# Patient Record
Sex: FEMALE | Race: BLACK OR AFRICAN AMERICAN | Employment: UNEMPLOYED | URBAN - METROPOLITAN AREA
[De-identification: names, ages, dates, MRNs, and addresses within clinical notes are randomized per-mention and may not be internally consistent; named-entity substitution may affect disease eponyms.]

---

## 2019-03-17 ENCOUNTER — APPOINTMENT (OUTPATIENT)
Dept: CT IMAGING | Age: 44
End: 2019-03-17
Payer: COMMERCIAL

## 2019-03-17 ENCOUNTER — HOSPITAL ENCOUNTER (EMERGENCY)
Age: 44
Discharge: ANOTHER HOSPITAL | End: 2019-03-17
Attending: EMERGENCY MEDICINE
Payer: COMMERCIAL

## 2019-03-17 ENCOUNTER — APPOINTMENT (OUTPATIENT)
Dept: GENERAL RADIOLOGY | Age: 44
End: 2019-03-17
Payer: COMMERCIAL

## 2019-03-17 VITALS
SYSTOLIC BLOOD PRESSURE: 143 MMHG | RESPIRATION RATE: 17 BRPM | HEART RATE: 92 BPM | BODY MASS INDEX: 29.16 KG/M2 | HEIGHT: 65 IN | DIASTOLIC BLOOD PRESSURE: 96 MMHG | WEIGHT: 175 LBS | OXYGEN SATURATION: 100 % | TEMPERATURE: 98.4 F

## 2019-03-17 DIAGNOSIS — K56.609 SMALL BOWEL OBSTRUCTION (HCC): Primary | ICD-10-CM

## 2019-03-17 LAB
A/G RATIO: 1.5 (ref 1.1–2.2)
ALBUMIN SERPL-MCNC: 4.6 G/DL (ref 3.4–5)
ALP BLD-CCNC: 62 U/L (ref 40–129)
ALT SERPL-CCNC: 18 U/L (ref 10–40)
ANION GAP SERPL CALCULATED.3IONS-SCNC: 16 MMOL/L (ref 3–16)
AST SERPL-CCNC: 20 U/L (ref 15–37)
BASE EXCESS VENOUS: -3 (ref -3–3)
BASOPHILS ABSOLUTE: 0 K/UL (ref 0–0.2)
BASOPHILS RELATIVE PERCENT: 0.2 %
BILIRUB SERPL-MCNC: 0.3 MG/DL (ref 0–1)
BILIRUBIN URINE: NEGATIVE
BLOOD, URINE: ABNORMAL
BUN BLDV-MCNC: 19 MG/DL (ref 7–20)
CALCIUM SERPL-MCNC: 9.3 MG/DL (ref 8.3–10.6)
CHLORIDE BLD-SCNC: 104 MMOL/L (ref 99–110)
CLARITY: CLEAR
CO2: 20 MMOL/L (ref 21–32)
COLOR: YELLOW
CREAT SERPL-MCNC: 0.8 MG/DL (ref 0.6–1.1)
EKG ATRIAL RATE: 74 BPM
EKG DIAGNOSIS: NORMAL
EKG P AXIS: 71 DEGREES
EKG P-R INTERVAL: 224 MS
EKG Q-T INTERVAL: 428 MS
EKG QRS DURATION: 96 MS
EKG QTC CALCULATION (BAZETT): 475 MS
EKG R AXIS: 57 DEGREES
EKG T AXIS: 35 DEGREES
EKG VENTRICULAR RATE: 74 BPM
EOSINOPHILS ABSOLUTE: 0.1 K/UL (ref 0–0.6)
EOSINOPHILS RELATIVE PERCENT: 0.8 %
EPITHELIAL CELLS, UA: NORMAL /HPF
GFR AFRICAN AMERICAN: >60
GFR NON-AFRICAN AMERICAN: >60
GLOBULIN: 3.1 G/DL
GLUCOSE BLD-MCNC: 170 MG/DL (ref 70–99)
GLUCOSE URINE: NEGATIVE MG/DL
HCG(URINE) PREGNANCY TEST: NEGATIVE
HCO3 VENOUS: 22.5 MMOL/L (ref 23–29)
HCT VFR BLD CALC: 36.6 % (ref 36–48)
HEMOGLOBIN: 11.7 G/DL (ref 12–16)
KETONES, URINE: 15 MG/DL
LACTATE: 1.89 MMOL/L (ref 0.4–2)
LEUKOCYTE ESTERASE, URINE: NEGATIVE
LIPASE: 20 U/L (ref 13–60)
LYMPHOCYTES ABSOLUTE: 3.8 K/UL (ref 1–5.1)
LYMPHOCYTES RELATIVE PERCENT: 34.6 %
MAGNESIUM: 2 MG/DL (ref 1.8–2.4)
MCH RBC QN AUTO: 26.7 PG (ref 26–34)
MCHC RBC AUTO-ENTMCNC: 31.8 G/DL (ref 31–36)
MCV RBC AUTO: 84 FL (ref 80–100)
MICROSCOPIC EXAMINATION: YES
MONOCYTES ABSOLUTE: 0.5 K/UL (ref 0–1.3)
MONOCYTES RELATIVE PERCENT: 5.1 %
NEUTROPHILS ABSOLUTE: 6.4 K/UL (ref 1.7–7.7)
NEUTROPHILS RELATIVE PERCENT: 59.3 %
NITRITE, URINE: POSITIVE
O2 SAT, VEN: 79 %
PCO2, VEN: 41.8 MM HG (ref 40–50)
PDW BLD-RTO: 22.9 % (ref 12.4–15.4)
PERFORMED ON: ABNORMAL
PH UA: 6 (ref 5–8)
PH VENOUS: 7.34 (ref 7.35–7.45)
PLATELET # BLD: 292 K/UL (ref 135–450)
PMV BLD AUTO: 8.2 FL (ref 5–10.5)
PO2, VEN: 46 MM HG
POC SAMPLE TYPE: ABNORMAL
POTASSIUM REFLEX MAGNESIUM: 3.2 MMOL/L (ref 3.5–5.1)
PROTEIN UA: 30 MG/DL
RBC # BLD: 4.36 M/UL (ref 4–5.2)
RBC UA: NORMAL /HPF (ref 0–2)
SODIUM BLD-SCNC: 140 MMOL/L (ref 136–145)
SPECIFIC GRAVITY UA: >=1.03 (ref 1–1.03)
TCO2 CALC VENOUS: 24 MMOL/L
TOTAL PROTEIN: 7.7 G/DL (ref 6.4–8.2)
URINE TYPE: ABNORMAL
UROBILINOGEN, URINE: 0.2 E.U./DL
WBC # BLD: 10.9 K/UL (ref 4–11)
WBC UA: NORMAL /HPF (ref 0–5)

## 2019-03-17 PROCEDURE — 6360000002 HC RX W HCPCS: Performed by: EMERGENCY MEDICINE

## 2019-03-17 PROCEDURE — 99291 CRITICAL CARE FIRST HOUR: CPT

## 2019-03-17 PROCEDURE — 83605 ASSAY OF LACTIC ACID: CPT

## 2019-03-17 PROCEDURE — 6360000004 HC RX CONTRAST MEDICATION: Performed by: EMERGENCY MEDICINE

## 2019-03-17 PROCEDURE — 96375 TX/PRO/DX INJ NEW DRUG ADDON: CPT

## 2019-03-17 PROCEDURE — 2580000003 HC RX 258: Performed by: EMERGENCY MEDICINE

## 2019-03-17 PROCEDURE — 74177 CT ABD & PELVIS W/CONTRAST: CPT

## 2019-03-17 PROCEDURE — 82803 BLOOD GASES ANY COMBINATION: CPT

## 2019-03-17 PROCEDURE — 80053 COMPREHEN METABOLIC PANEL: CPT

## 2019-03-17 PROCEDURE — 96361 HYDRATE IV INFUSION ADD-ON: CPT

## 2019-03-17 PROCEDURE — 96376 TX/PRO/DX INJ SAME DRUG ADON: CPT

## 2019-03-17 PROCEDURE — 83735 ASSAY OF MAGNESIUM: CPT

## 2019-03-17 PROCEDURE — 85025 COMPLETE CBC W/AUTO DIFF WBC: CPT

## 2019-03-17 PROCEDURE — 99243 OFF/OP CNSLTJ NEW/EST LOW 30: CPT | Performed by: SURGERY

## 2019-03-17 PROCEDURE — 93005 ELECTROCARDIOGRAM TRACING: CPT | Performed by: EMERGENCY MEDICINE

## 2019-03-17 PROCEDURE — 6360000002 HC RX W HCPCS

## 2019-03-17 PROCEDURE — 83690 ASSAY OF LIPASE: CPT

## 2019-03-17 PROCEDURE — 96374 THER/PROPH/DIAG INJ IV PUSH: CPT

## 2019-03-17 PROCEDURE — 74018 RADEX ABDOMEN 1 VIEW: CPT

## 2019-03-17 PROCEDURE — 81001 URINALYSIS AUTO W/SCOPE: CPT

## 2019-03-17 PROCEDURE — 84703 CHORIONIC GONADOTROPIN ASSAY: CPT

## 2019-03-17 RX ORDER — FERROUS SULFATE 325(65) MG
325 TABLET ORAL
COMMUNITY

## 2019-03-17 RX ORDER — M-VIT,TX,IRON,MINS/CALC/FOLIC 27MG-0.4MG
1 TABLET ORAL DAILY
COMMUNITY

## 2019-03-17 RX ORDER — MORPHINE SULFATE 4 MG/ML
INJECTION, SOLUTION INTRAMUSCULAR; INTRAVENOUS
Status: COMPLETED
Start: 2019-03-17 | End: 2019-03-17

## 2019-03-17 RX ORDER — MORPHINE SULFATE 4 MG/ML
4 INJECTION, SOLUTION INTRAMUSCULAR; INTRAVENOUS ONCE
Status: COMPLETED | OUTPATIENT
Start: 2019-03-17 | End: 2019-03-17

## 2019-03-17 RX ORDER — DULOXETIN HYDROCHLORIDE 20 MG/1
20 CAPSULE, DELAYED RELEASE ORAL DAILY
COMMUNITY

## 2019-03-17 RX ORDER — ONDANSETRON 2 MG/ML
4 INJECTION INTRAMUSCULAR; INTRAVENOUS ONCE
Status: COMPLETED | OUTPATIENT
Start: 2019-03-17 | End: 2019-03-17

## 2019-03-17 RX ORDER — CALCIUM CARBONATE 500(1250)
500 TABLET ORAL DAILY
COMMUNITY

## 2019-03-17 RX ORDER — 0.9 % SODIUM CHLORIDE 0.9 %
1000 INTRAVENOUS SOLUTION INTRAVENOUS ONCE
Status: COMPLETED | OUTPATIENT
Start: 2019-03-17 | End: 2019-03-17

## 2019-03-17 RX ORDER — PROMETHAZINE HYDROCHLORIDE 25 MG/ML
12.5 INJECTION, SOLUTION INTRAMUSCULAR; INTRAVENOUS ONCE
Status: COMPLETED | OUTPATIENT
Start: 2019-03-17 | End: 2019-03-17

## 2019-03-17 RX ORDER — ONDANSETRON 2 MG/ML
INJECTION INTRAMUSCULAR; INTRAVENOUS
Status: COMPLETED
Start: 2019-03-17 | End: 2019-03-17

## 2019-03-17 RX ADMIN — MORPHINE SULFATE 4 MG: 4 INJECTION, SOLUTION INTRAMUSCULAR; INTRAVENOUS at 04:08

## 2019-03-17 RX ADMIN — PROMETHAZINE HYDROCHLORIDE 12.5 MG: 25 INJECTION INTRAMUSCULAR; INTRAVENOUS at 08:35

## 2019-03-17 RX ADMIN — ONDANSETRON 4 MG: 2 INJECTION INTRAMUSCULAR; INTRAVENOUS at 04:09

## 2019-03-17 RX ADMIN — HYDROMORPHONE HYDROCHLORIDE 1 MG: 1 INJECTION, SOLUTION INTRAMUSCULAR; INTRAVENOUS; SUBCUTANEOUS at 12:20

## 2019-03-17 RX ADMIN — HYDROMORPHONE HYDROCHLORIDE 1 MG: 1 INJECTION, SOLUTION INTRAMUSCULAR; INTRAVENOUS; SUBCUTANEOUS at 08:32

## 2019-03-17 RX ADMIN — MORPHINE SULFATE 4 MG: 4 INJECTION, SOLUTION INTRAMUSCULAR; INTRAVENOUS at 05:15

## 2019-03-17 RX ADMIN — ONDANSETRON 4 MG: 2 INJECTION INTRAMUSCULAR; INTRAVENOUS at 06:15

## 2019-03-17 RX ADMIN — HYDROMORPHONE HYDROCHLORIDE 1 MG: 1 INJECTION, SOLUTION INTRAMUSCULAR; INTRAVENOUS; SUBCUTANEOUS at 06:15

## 2019-03-17 RX ADMIN — IOPAMIDOL 80 ML: 755 INJECTION, SOLUTION INTRAVENOUS at 06:00

## 2019-03-17 RX ADMIN — SODIUM CHLORIDE 1000 ML: 9 INJECTION, SOLUTION INTRAVENOUS at 04:23

## 2019-03-17 ASSESSMENT — PAIN DESCRIPTION - DESCRIPTORS: DESCRIPTORS: ACHING

## 2019-03-17 ASSESSMENT — PAIN SCALES - GENERAL
PAINLEVEL_OUTOF10: 10
PAINLEVEL_OUTOF10: 7
PAINLEVEL_OUTOF10: 5
PAINLEVEL_OUTOF10: 9
PAINLEVEL_OUTOF10: 9

## 2019-03-17 ASSESSMENT — PAIN DESCRIPTION - PAIN TYPE: TYPE: ACUTE PAIN

## 2019-03-17 ASSESSMENT — PAIN DESCRIPTION - FREQUENCY: FREQUENCY: CONTINUOUS

## 2019-03-17 ASSESSMENT — PAIN DESCRIPTION - PROGRESSION
CLINICAL_PROGRESSION: RESOLVED
CLINICAL_PROGRESSION: GRADUALLY IMPROVING

## 2019-03-17 ASSESSMENT — PAIN DESCRIPTION - LOCATION: LOCATION: ABDOMEN

## 2019-03-17 NOTE — ED NOTES
16 [place] Dual Lumen Stomach Tube to left Nares secured at 53cc. Pt tolerated well.  Connected to int suction      Efrem Saleem RN  03/17/19 117 Cesar Escoto RN  03/17/19 [id number]

## 2019-03-17 NOTE — PROGRESS NOTES
Interval Note:    Spoke to Dr. Rios Roth at [facility] Bariatric surgery. He will accept the patient for transfer. Concern for SBO, possible internal hernia.      Mallory Bean MD  9:23 AM  3/17/2019  [phone]

## 2019-03-17 NOTE — CONSULTS
General Surgery   Resident Consult Note    Reason for Consult: SBO    History of Present Illness:   Rosangela Masters is a 37 y.o. female with Hx of depression, asthma, , morbid obesity s/p RNY gastrectomy in 2017 at [facility] with Dr. Alf Romeo who presents with acute abdominal pain since 2 AM this morning. Pain is non-radiating crampy in nature in the upper abdomen. Associated symptoms include nausea and non-bilious non-bloody vomiting x3, dry heaving, clammy and chill. Denies fever, chest pain, dyspnea, or dysuria. Patient denies previous symptoms. Last PO intake 1245 AM. Last BM was this morning around 4-5 AM with light colored soft stool. Denies constipation or diarrhea. Of note, patient has lost 150 lbs since her bariatric surgery. In the ED, patient received morphine and zofran with improvement of symptoms. In addition, NG tube was placed. Past Medical History:        Diagnosis Date    Asthma     Depression        Past Surgical History:           Procedure Laterality Date    ABDOMEN SURGERY      BARIATRIC SURGERY         Allergies:  Patient has no known allergies. Medications:   Home Meds  Mutlivitamins, calcium, cymbalta, iron    Current Meds    HYDROmorphone (DILAUDID) injection 1 mg Q30 Min PRN       Family History:   History reviewed. No pertinent family history. Social History:   TOBACCO:   does not have a smoking history on file. She has never used smokeless tobacco.  ETOH:   reports that she drinks alcohol. DRUGS:   reports that she has current or past drug history. ROS: A 14 point review of systems was conducted, significant findings as noted in HPI. All other systems negative.     Physical exam:    Vitals:    19 0400 19 0640 19 0641 19 0718   BP: (!) 165/115 (!) 140/95  (!) 142/95   Pulse:  84  75   Resp:    18   Temp:       TempSrc:       SpO2: 100%  94% 100%       General appearance: alert, no acute distress, grooming appropriate  Eyes: PERRLA, no scleral icterus  Neck: trachea midline, no JVD, no lymphadenopathy  Chest/Lungs: CTAB, no crackles/rales, wheezes/rhonchi, normal effort  Cardiovascular: RRR, no murmurs/gallops/rubs  Abdomen: soft, excess skin fold, moderate tenderness in the epigastric area, non-distended, +BS, no guarding/rigidity. NG at 65 cm at the nare  Skin: warm and dry, no rashes  Extremities: no edema, no cyanosis  Neuro: A&Ox3, no focal deficits, sensation intact    Labs:    CBC: Recent Labs     19  0418   WBC 10.9   HGB 11.7*   HCT 36.6   MCV 84.0        BMP: Recent Labs     19  0418      K 3.2*      CO2 20*   BUN 19   CREATININE 0.8     PT/INR: No results for input(s): PROTIME, INR in the last 72 hours. APTT: No results for input(s): APTT in the last 72 hours. Liver Profile:  Lab Results   Component Value Date    AST 20 2019    ALT 18 2019    BILITOT 0.3 2019    ALKPHOS 62 2019   No results found for: CHOL, HDL, TRIG  UA: Lab Results   Component Value Date    COLORU Yellow 2019    PHUR 6.0 2019    WBCUA 0-2 2019    RBCUA 0-2 2019    CLARITYU Clear 2019    SPECGRAV >=1.030 2019    LEUKOCYTESUR Negative 2019    UROBILINOGEN 0.2 2019    BILIRUBINUR Negative 2019    BLOODU TRACE-INTACT 2019    GLUCOSEU Negative 2019       Imaging:   CT ABDOMEN PELVIS W IV CONTRAST Additional Contrast? None   Final Result     Small bowel obstruction, high-grade versus complete. Assessment/Plan: This is a 37 y.o. female with Hx of depression, asthma, , morbid obesity s/p RNY gastrectomy in 2017 at [facility] with Dr. Sima Pichardo who presents with acute abdominal pain, nausea and vomiting. CT scan was reviewed with radiology and it is concerning for small bowel obstruction with mesenteric edema. Labs are within normal limit.  UA was positive for nitriate, however patient

## 2019-03-17 NOTE — ED NOTES
Patient states she feels much better. Resting comfortably in bed.       Christelle Newby RN  03/17/19 [id number]

## 2019-03-17 NOTE — ED PROVIDER NOTES
[address] ENCOUNTER          ATTENDING PHYSICIAN NOTE       Date of evaluation: 3/17/2019    Chief Complaint     Abdominal Pain; Emesis; and Nausea      History of Present Illness     Kristy Lr is a 37 y.o. female w history of gastric bypass surgery (years ago) who presents with central abdominal pain for approximately 2 hours of duration. She reports that she was lying down to bed tonight when she developed the sudden onset of a constant abdominal pain located in the central abdomen and in the left upper quadrant. It is not radiating to the right lower quadrant or right upper quadrant. She has no alleviating or aggravating factors, can think of no antecedent events. It's associated with nausea and dry heaves. Denies change in bowel or bladder habits, diarrhea, or dysuria. No fever    Review of Systems     Review of Systems  Pertinent positives and negatives are listed in the HPI; otherwise all systems are reviewed and were negative  Past Medical, Surgical, Family, and Social History     She has a past medical history of Asthma and Depression. She has a past surgical history that includes Abdomen surgery and Bariatric Surgery. Her family history is not on file. She does not have a smoking history on file. She has never used smokeless tobacco. She reports that she drinks alcohol. She reports that she has current or past drug history. Medications     Previous Medications    No medications on file       Allergies     She has No Known Allergies.     Physical Exam     INITIAL VITALS: BP: (!) 154/107, Temp: 98.4 °F (36.9 °C), Pulse: 115, Resp: 22, SpO2: 100 %    Physical Exam  GEN: Very uncomfortable appearing adult female, retching in bed  HEENT: Mucous membranes moist, normocephalic, atraumatic, pupils equal round and reactive to light, sclera anicteric  NECK: Trachea midline  CARDIOVASCULAR: Regular rate and rhythm  RESP: Breathing even and unlabored  ABD: Abdomen soft, tender to palpation in the left upper quadrant extending to the periumbilical region. Nontender in the right upper quadrant or right lower quadrant. No peritoneal signs. :   EXT: No edema or wounds noted  NEURO: Alert, oriented ×4, moving all extremities without gross neurological deficit  SKIN: Warm and dry  Diagnostic Results     EKG   Sinus rhythm, rate 74 bpm, first degree AV block with MI interval 224, QRS narrow 96 ms, ; no pathologic ST elevation is noted. RADIOLOGY:  CT ABDOMEN PELVIS W IV CONTRAST Additional Contrast? None   Final Result     Small bowel obstruction, high-grade versus complete.               LABS:   Results for orders placed or performed during the hospital encounter of 03/17/19   Comprehensive Metabolic Panel w/ Reflex to MG   Result Value Ref Range    Sodium 140 136 - 145 mmol/L    Potassium reflex Magnesium 3.2 (L) 3.5 - 5.1 mmol/L    Chloride 104 99 - 110 mmol/L    CO2 20 (L) 21 - 32 mmol/L    Anion Gap 16 3 - 16    Glucose 170 (H) 70 - 99 mg/dL    BUN 19 7 - 20 mg/dL    CREATININE 0.8 0.6 - 1.1 mg/dL    GFR Non-African American >60 >60    GFR African American >60 >60    Calcium 9.3 8.3 - 10.6 mg/dL    Total Protein 7.7 6.4 - 8.2 g/dL    Alb 4.6 3.4 - 5.0 g/dL    Albumin/Globulin Ratio 1.5 1.1 - 2.2    Total Bilirubin 0.3 0.0 - 1.0 mg/dL    Alkaline Phosphatase 62 40 - 129 U/L    ALT 18 10 - 40 U/L    AST 20 15 - 37 U/L    Globulin 3.1 g/dL   Lipase   Result Value Ref Range    Lipase 20.0 13.0 - 60.0 U/L   Urinalysis, reflex to microscopic   Result Value Ref Range    Color, UA Yellow Straw/Yellow    Clarity, UA Clear Clear    Glucose, Ur Negative Negative mg/dL    Bilirubin Urine Negative Negative    Ketones, Urine 15 (A) Negative mg/dL    Specific Gravity, UA >=1.030 1.005 - 1.030    Blood, Urine TRACE-INTACT (A) Negative    pH, UA 6.0 5.0 - 8.0    Protein, UA 30 (A) Negative mg/dL    Urobilinogen, Urine 0.2 <2.0 E.U./dL    Nitrite, Urine POSITIVE (A) Negative Leukocyte Esterase, Urine Negative Negative    Microscopic Examination YES     Urine Type Not Specified    Pregnancy, Urine   Result Value Ref Range    HCG(Urine) Pregnancy Test Negative Detects HCG level >20 MIU/mL   CBC auto differential   Result Value Ref Range    WBC 10.9 4.0 - 11.0 K/uL    RBC 4.36 4.00 - 5.20 M/uL    Hemoglobin 11.7 (L) 12.0 - 16.0 g/dL    Hematocrit 36.6 36.0 - 48.0 %    MCV 84.0 80.0 - 100.0 fL    MCH 26.7 26.0 - 34.0 pg    MCHC 31.8 31.0 - 36.0 g/dL    RDW 22.9 (H) 12.4 - 15.4 %    Platelets 812 573 - 262 K/uL    MPV 8.2 5.0 - 10.5 fL    Neutrophils % 59.3 %    Lymphocytes % 34.6 %    Monocytes % 5.1 %    Eosinophils % 0.8 %    Basophils % 0.2 %    Neutrophils # 6.4 1.7 - 7.7 K/uL    Lymphocytes # 3.8 1.0 - 5.1 K/uL    Monocytes # 0.5 0.0 - 1.3 K/uL    Eosinophils # 0.1 0.0 - 0.6 K/uL    Basophils # 0.0 0.0 - 0.2 K/uL   Microscopic Urinalysis   Result Value Ref Range    WBC, UA 0-2 0 - 5 /HPF    RBC, UA 0-2 0 - 2 /HPF    Epi Cells 0-2 /HPF   Magnesium   Result Value Ref Range    Magnesium 2.00 1.80 - 2.40 mg/dL   POCT Venous   Result Value Ref Range    pH, Dada 7.339 (L) 7.350 - 7.450    pCO2, Dada 41.8 40.0 - 50.0 mm Hg    pO2, Dada 46 Not Established mm Hg    HCO3, Venous 22.5 (L) 23.0 - 29.0 mmol/L    Base Excess, Dada -3 -3 - 3    O2 Sat, Dada 79 Not Established %    TC02 (Calc), Dada 24 Not Established mmol/L    Lactate 1.89 0.40 - 2.00 mmol/L    Sample Type DADA     Performed on SEE BELOW        ED BEDSIDE ULTRASOUND:      RECENT VITALS:  BP: (!) 140/95,Temp: 98.4 °F (36.9 °C), Pulse: 84, Resp: 22, SpO2: 94 %     Procedures         ED Course     Nursing Notes, Past Medical Hx, Past Surgical Hx, Social Hx,Allergies, and Family Hx were reviewed.     The patient was given the following medications:  Orders Placed This Encounter   Medications    0.9 % sodium chloride bolus    morphine injection 4 mg    ondansetron (ZOFRAN) injection 4 mg    ondansetron (ZOFRAN) 4 MG/2ML injection     HOWARD, HANNAH: cabinet override    morphine 4 MG/ML injection     HANNAH HOWARD: cabinet override    morphine injection 4 mg    iopamidol (ISOVUE-370) 76 % injection 80 mL    ondansetron (ZOFRAN) injection 4 mg    HYDROmorphone (DILAUDID) injection 1 mg       CONSULTS:  Kita Carrasco DECISIONMAKING / Linden Arrington / Bruna Maia is a 37 y.o. female who presents with abdominal pain and nausea and vomiting for about 2 hours duration. Labs are not terribly revealing other than evidence of UTI; lactate not elevated. She was given Dilaudid and Zofran which helped her nausea and pain. CT abdomen and pelvis demonstrates what appears be a small bowel obstruction, with transition point at the anastomosis site. Will plan to place NG tube; surgery was consulted for potential admission and management. Critical Care:  Due to the immediate potential for life-threatening deterioration due to high grade small bowel obstruction, I spent 33 minutes providing critical care. Thistime excludes time spent performing procedures but includes time spent on direct patient care, history retrieval, review of the chart, and discussions with patient, family, and consultant(s). Clinical Impression     1. Small bowel obstruction (HCC)        Disposition     PATIENT REFERRED TO:  No follow-up provider specified.     DISCHARGE MEDICATIONS:  New Prescriptions    No medications on file       DISPOSITION Decision To Admit 03/17/2019 06:47:26 AM       Randy Drake MD  03/17/19 [id number]

## 2019-03-17 NOTE — ED NOTES
[facility] here to pick pt up. PT given pain medication, tolerated well. Pt's belongings placed in 2 belonging bags. Pt in no signs of distress at this time. Rae has paperwork.       Blake Rojo RN  03/17/19 [id number]